# Patient Record
Sex: FEMALE | Race: OTHER | Employment: FULL TIME | URBAN - NONMETROPOLITAN AREA
[De-identification: names, ages, dates, MRNs, and addresses within clinical notes are randomized per-mention and may not be internally consistent; named-entity substitution may affect disease eponyms.]

---

## 2017-11-24 ENCOUNTER — HOSPITAL ENCOUNTER (EMERGENCY)
Age: 37
Discharge: HOME | End: 2017-11-24
Attending: EMERGENCY MEDICINE
Payer: COMMERCIAL

## 2017-11-24 VITALS
TEMPERATURE: 98.4 F | RESPIRATION RATE: 16 BRPM | SYSTOLIC BLOOD PRESSURE: 108 MMHG | HEART RATE: 81 BPM | OXYGEN SATURATION: 98 % | DIASTOLIC BLOOD PRESSURE: 70 MMHG

## 2017-11-24 DIAGNOSIS — R10.13 ABDOMINAL PAIN, EPIGASTRIC: Primary | ICD-10-CM

## 2017-11-24 LAB
ALBUMIN SERPL-MCNC: 3.2 GM/DL (ref 3.5–5)
ALP BLD-CCNC: 81 U/L (ref 46–116)
ALT SERPL-CCNC: 24 U/L (ref 12–78)
AMORPHOUS: NORMAL
ANION GAP: 7 MEQ/L (ref 8–16)
AST SERPL-CCNC: 16 U/L (ref 15–37)
BACTERIA: NORMAL
BASOPHILS # BLD: 0.6 % (ref 0–3)
BILIRUB SERPL-MCNC: 0.2 MG/DL (ref 0.2–1)
BILIRUBIN URINE: NEGATIVE
BLOOD, URINE: NEGATIVE
BUN BLDV-MCNC: 7 MG/DL (ref 7–18)
CASTS UA: NORMAL /LPF
CHARACTER, URINE: CLEAR
CHLORIDE BLD-SCNC: 103 MEQ/L (ref 98–107)
CO2: 29 MEQ/L (ref 21–32)
COLOR: YELLOW
CREAT SERPL-MCNC: 0.6 MG/DL (ref 0.6–1.1)
CRYSTALS, UA: NORMAL
EOSINOPHILS RELATIVE PERCENT: 5.2 % (ref 0–4)
EPITHELIAL CELLS, UA: NORMAL /HPF
GFR, ESTIMATED: > 90 ML/MIN/1.73M2
GLUCOSE BLD-MCNC: 89 MG/DL (ref 74–106)
GLUCOSE, URINE: NEGATIVE MG/DL
HCT VFR BLD CALC: 39.1 % (ref 37–47)
HEMOGLOBIN: 13.2 GM/DL (ref 12–16)
KETONES, URINE: NEGATIVE
LEUKOCYTE ESTERASE, URINE: NEGATIVE
LIPASE: 175 U/L (ref 65–230)
LYMPHOCYTES # BLD: 36 % (ref 15–47)
MCH RBC QN AUTO: 30.2 PG (ref 27–31)
MCHC RBC AUTO-ENTMCNC: 33.8 GM/DL (ref 33–37)
MCV RBC AUTO: 89.2 FL (ref 81–99)
MONOCYTES: 7.1 % (ref 0–12)
MUCUS: NORMAL
NITRITE, URINE: NEGATIVE
PDW BLD-RTO: 11.8 % (ref 11.5–14.5)
PH UA: 6.5 (ref 5–9)
PLATELET # BLD: 250 THOU/MM3 (ref 130–400)
PMV BLD AUTO: 7.8 MCM (ref 7.4–10.4)
POC CALCIUM: 8.4 MG/DL (ref 8.5–10.1)
POTASSIUM SERPL-SCNC: 3.6 MEQ/L (ref 3.5–5.1)
PREGNANCY, URINE: NEGATIVE
PROTEIN UA: NEGATIVE MG/DL
RBC # BLD: 4.38 MILL/MM3 (ref 4.2–5.4)
RBC UA: NORMAL /HPF
REFLEX TO URINE C & S: NORMAL
SEGS: 51.1 % (ref 43–75)
SODIUM BLD-SCNC: 139 MEQ/L (ref 136–145)
SPECIFIC GRAVITY UA: 1.01 (ref 1–1.03)
TOTAL PROTEIN: 7.4 GM/DL (ref 6.4–8.2)
UROBILINOGEN, URINE: 0.2 EU/DL (ref 0–1)
WBC # BLD: 6.9 THOU/MM3 (ref 4.8–10.8)
WBC UA: NORMAL /HPF

## 2017-11-24 PROCEDURE — 99284 EMERGENCY DEPT VISIT MOD MDM: CPT

## 2017-11-24 PROCEDURE — 83690 ASSAY OF LIPASE: CPT

## 2017-11-24 PROCEDURE — 36415 COLL VENOUS BLD VENIPUNCTURE: CPT

## 2017-11-24 PROCEDURE — 81001 URINALYSIS AUTO W/SCOPE: CPT

## 2017-11-24 PROCEDURE — 85025 COMPLETE CBC W/AUTO DIFF WBC: CPT

## 2017-11-24 PROCEDURE — 84703 CHORIONIC GONADOTROPIN ASSAY: CPT

## 2017-11-24 PROCEDURE — 6370000000 HC RX 637 (ALT 250 FOR IP): Performed by: EMERGENCY MEDICINE

## 2017-11-24 PROCEDURE — 80053 COMPREHEN METABOLIC PANEL: CPT

## 2017-11-24 RX ORDER — OMEPRAZOLE 40 MG/1
40 CAPSULE, DELAYED RELEASE ORAL DAILY
Qty: 30 CAPSULE | Refills: 0 | Status: SHIPPED | OUTPATIENT
Start: 2017-11-24 | End: 2021-10-02

## 2017-11-24 RX ADMIN — Medication 30 ML: at 15:29

## 2017-11-24 ASSESSMENT — PAIN SCALES - GENERAL: PAINLEVEL_OUTOF10: 3

## 2017-11-24 ASSESSMENT — PAIN DESCRIPTION - LOCATION: LOCATION: ABDOMEN

## 2017-11-24 ASSESSMENT — PAIN DESCRIPTION - ORIENTATION: ORIENTATION: MID;UPPER

## 2017-11-24 ASSESSMENT — PAIN DESCRIPTION - PAIN TYPE: TYPE: ACUTE PAIN

## 2017-11-24 ASSESSMENT — PAIN SCALES - WONG BAKER: WONGBAKER_NUMERICALRESPONSE: 0

## 2017-11-24 NOTE — ED PROVIDER NOTES
eMERGENCY dEPARTMENT eNCOUnter      [address] [facility]    Chief Complaint   Patient presents with    Abdominal Pain       HPI    Landy Calderon is a 40 y.o. female who presents  Above-noted complaint. Patient presents with abdominal pain. Pain was more severe couple days gets infected scalp a lot better but still present. She called her doctor's office and they told her to come here. She complains of mid epigastric pain and slight right upper quadrant pain. Initially may be hard to breathe but not currently. Denies vomiting or fever at this time. She thought it could be gas. PAST MEDICAL HISTORY    History reviewed. No pertinent past medical history. SURGICAL HISTORY    Past Surgical History:   Procedure Laterality Date    ANKLE FRACTURE SURGERY      TENDON RELEASE      TUBAL LIGATION  14 years ago       CURRENT MEDICATIONS        ALLERGIES    No Known Allergies    FAMILY HISTORY    History reviewed. No pertinent family history. SOCIAL HISTORY    Social History     Social History    Marital status:      Spouse name: N/A    Number of children: N/A    Years of education: N/A     Social History Main Topics    Smoking status: Never Smoker    Smokeless tobacco: Never Used    Alcohol use Yes      Comment: occasional    Drug use: Unknown    Sexual activity: Not Asked     Other Topics Concern    None     Social History Narrative    None       REVIEW OF SYSTEMS    Positive for right quadrant pain. No urinary symptoms. No dysuria hematuria. No melena. All systems negative except as marked. PHYSICAL EXAM    VITAL SIGNS: /70   Pulse 81   Temp 98.4 °F (36.9 °C) (Oral)   Resp 16   LMP 11/17/2017   SpO2 98%    Constitutional:  Alert not toxtic or ill, pleasant and able to give coherent history  HENT:  Normocephalic, Atraumatic, Bilateral external ears normal, Oropharynx moist, No oral exudates, Nose normal.  Cervical Spine: Normal range of motion,  No stridor.  No tenderness, Supple,  Eyes:  No discharge or  Swelling,Conjunctiva normal, PERRL, EOMI,  Respiratory: No respiratory distress, Normal breath sounds,  No wheezing, No chest tenderness. Cardiovascular:  Normal heart rate, Normal rhythm, No murmurs, No rubs, No gallops. GI:  Minimal right upper quadrant reproducible pain, Bowel sounds normal, Soft, No masses, No pulsatile masses. No peritoneal tenderness, negative Humphreys's  Musculoskeletal:  Intact distal pulses, No edema, No tenderness, No cyanosis, No clubbing. Good range of motion in all major joints. No tenderness to palpation or major deformities noted. Back:No tenderness. Integument:  Warm, Dry, No erythema, No rash (on exposed areas)   Lymphatic:  No lymphadenopathy noted. Neurologic:  Alert & oriented x 3, Normal motor function, Normal sensory function, No focal deficits noted. Psychiatric:  Affect normal, Judgment normal, Mood normal.                 RADIOLOGY    US GALLBLADDER RUQ    (Results Pending)       PROCEDURES    none      CONSULTS:  None      CRITICAL CARE:  None      ED COURSE & MEDICAL DECISION MAKING    Pertinent Labs & Imaging studies reviewed. (See chart for details)  Midepigastric and right upper quadrant abdominal pain. No associated symptoms such as high fever chills. Symptoms are actually a lot better than they were. Her clinical exam here is benign although has some residual right upper quadrant tenderness. Informed her would like to get gallbladder ultrasound set up as an outpatient. She'll need close follow-up with primary care. We are checking some routine labs and urine. 4:00 PM  Lab tests are normal including CBC and Chem-7 lipase. Nonspecific abdominal pain. Could represent some biliary colic versus some reflux or GI symptoms. Nothing else to suggest peritonitis. Informed patient we need close follow-up with primary care. Place her on a PPI for the next few days and follow-up    FINAL IMPRESSION    1.  Abdominal pain,

## 2018-02-16 ENCOUNTER — HOSPITAL ENCOUNTER (OUTPATIENT)
Dept: MRI IMAGING | Age: 38
Discharge: HOME | End: 2018-02-16
Payer: COMMERCIAL

## 2018-02-16 DIAGNOSIS — M79.671 RIGHT FOOT PAIN: ICD-10-CM

## 2018-02-16 PROCEDURE — 73718 MRI LOWER EXTREMITY W/O DYE: CPT

## 2020-03-05 ENCOUNTER — HOSPITAL ENCOUNTER (OUTPATIENT)
Dept: MRI IMAGING | Age: 40
Discharge: HOME | End: 2020-03-05
Payer: COMMERCIAL

## 2020-03-05 PROCEDURE — 72148 MRI LUMBAR SPINE W/O DYE: CPT

## 2021-10-02 ENCOUNTER — HOSPITAL ENCOUNTER (EMERGENCY)
Age: 41
Discharge: HOME | End: 2021-10-03
Attending: FAMILY MEDICINE
Payer: COMMERCIAL

## 2021-10-02 ENCOUNTER — APPOINTMENT (OUTPATIENT)
Dept: CT IMAGING | Age: 41
End: 2021-10-02
Payer: COMMERCIAL

## 2021-10-02 VITALS
HEART RATE: 80 BPM | HEIGHT: 59 IN | SYSTOLIC BLOOD PRESSURE: 113 MMHG | TEMPERATURE: 98 F | DIASTOLIC BLOOD PRESSURE: 78 MMHG | OXYGEN SATURATION: 100 % | RESPIRATION RATE: 16 BRPM | BODY MASS INDEX: 39.92 KG/M2 | WEIGHT: 198 LBS

## 2021-10-02 DIAGNOSIS — R55 SYNCOPE AND COLLAPSE: Primary | ICD-10-CM

## 2021-10-02 LAB
ALBUMIN SERPL-MCNC: 3.2 GM/DL (ref 3.4–5)
ALP BLD-CCNC: 74 U/L (ref 46–116)
ALT SERPL-CCNC: 26 U/L (ref 14–63)
ANALYZED BY:: NORMAL
ANION GAP: 8 MEQ/L (ref 8–16)
AST SERPL-CCNC: 14 U/L (ref 15–37)
BASOPHILS # BLD: 0.5 % (ref 0–3)
BILIRUB SERPL-MCNC: 0.2 MG/DL (ref 0.2–1)
BUN BLDV-MCNC: 8 MG/DL (ref 7–18)
CHLORIDE BLD-SCNC: 102 MEQ/L (ref 98–107)
CO2: 29 MEQ/L (ref 21–32)
CREAT SERPL-MCNC: 1.1 MG/DL (ref 0.6–1.3)
DATE OF COLLECTION: NORMAL
DRAWN BY: NORMAL
EOSINOPHILS RELATIVE PERCENT: 3.9 % (ref 0–4)
ETHYL ALCOHOL: < 0.01 % (GM/DL)
GFR, ESTIMATED: 58 ML/MIN/1.73M2
GLUCOSE BLD-MCNC: 102 MG/DL (ref 70–108)
GLUCOSE BLD-MCNC: 99 MG/DL (ref 74–106)
HCT VFR BLD CALC: 40.2 % (ref 37–47)
HEMOGLOBIN: 12.9 GM/DL (ref 12–16)
LYMPHOCYTES # BLD: 25.4 % (ref 15–47)
Lab: 2240
Lab: NORMAL
MCH RBC QN AUTO: 29.1 PG (ref 27–31)
MCHC RBC AUTO-ENTMCNC: 32.2 GM/DL (ref 33–37)
MCV RBC AUTO: 90.5 FL (ref 81–99)
MONOCYTES: 7.2 % (ref 0–12)
PDW BLD-RTO: 13.7 % (ref 11.5–14.5)
PLATELET # BLD: 310 THOU/MM3 (ref 130–400)
PMV BLD AUTO: 7.9 FL (ref 7.4–10.4)
POC CALCIUM: 8.4 MG/DL (ref 8.5–10.1)
POTASSIUM SERPL-SCNC: 3.4 MEQ/L (ref 3.5–5.1)
PREGNANCY, SERUM: NEGATIVE
RBC # BLD: 4.44 MILL/MM3 (ref 4.2–5.4)
SEGS: 63 % (ref 43–75)
SODIUM BLD-SCNC: 139 MEQ/L (ref 136–145)
TOTAL PROTEIN: 7.3 GM/DL (ref 6.4–8.2)
TROPONIN I: < 0.017 NG/ML (ref 0.02–0.06)
WBC # BLD: 8 THOU/MM3 (ref 4.8–10.8)

## 2021-10-02 PROCEDURE — 82948 REAGENT STRIP/BLOOD GLUCOSE: CPT

## 2021-10-02 PROCEDURE — 99284 EMERGENCY DEPT VISIT MOD MDM: CPT

## 2021-10-02 PROCEDURE — 93005 ELECTROCARDIOGRAM TRACING: CPT | Performed by: FAMILY MEDICINE

## 2021-10-02 PROCEDURE — 2580000003 HC RX 258: Performed by: FAMILY MEDICINE

## 2021-10-02 PROCEDURE — 80053 COMPREHEN METABOLIC PANEL: CPT

## 2021-10-02 PROCEDURE — 70450 CT HEAD/BRAIN W/O DYE: CPT

## 2021-10-02 PROCEDURE — 36415 COLL VENOUS BLD VENIPUNCTURE: CPT

## 2021-10-02 PROCEDURE — 85025 COMPLETE CBC W/AUTO DIFF WBC: CPT

## 2021-10-02 PROCEDURE — 84484 ASSAY OF TROPONIN QUANT: CPT

## 2021-10-02 PROCEDURE — 84703 CHORIONIC GONADOTROPIN ASSAY: CPT

## 2021-10-02 PROCEDURE — 82077 ASSAY SPEC XCP UR&BREATH IA: CPT

## 2021-10-02 RX ORDER — 0.9 % SODIUM CHLORIDE 0.9 %
500 INTRAVENOUS SOLUTION INTRAVENOUS ONCE
Status: COMPLETED | OUTPATIENT
Start: 2021-10-02 | End: 2021-10-02

## 2021-10-02 RX ADMIN — SODIUM CHLORIDE 500 ML: 9 INJECTION, SOLUTION INTRAVENOUS at 22:43

## 2021-10-02 ASSESSMENT — ENCOUNTER SYMPTOMS
SHORTNESS OF BREATH: 0
ABDOMINAL PAIN: 0
VOMITING: 0
COUGH: 0
NAUSEA: 0

## 2021-10-03 NOTE — ED NOTES
Patient up to W/C to bathroom. Observed patient steady no complaint of dizziness. Take to and back from bathroom to bed.       Kristie Leonard RN  10/02/21 [id number]

## 2021-10-03 NOTE — ED NOTES
Observed patient sitting up in bed, resp easy, warm and dry. Patient stable for discharge,  instructions provided. Patient educated on rest, increased fluids, position changes, signs and symptoms to monitor, and follow up. Patient verbalized understanding, questions denied. Assisted in getting patient dressed. Patient declined W/C. Observed patient steadily ambulate from the department after discharge with significant.       Jaclyn Marcial RN  10/03/21 0002

## 2021-10-03 NOTE — ED PROVIDER NOTES
[facility]  eMERGENCY dEPARTMENT eNCOUnter          CHIEF COMPLAINT       Chief Complaint   Patient presents with    Loss of Consciousness       Nurses Notes reviewed and I agree except as noted in the HPI. HISTORY OF PRESENT ILLNESS    Asher Kay is a 39 y.o. female who presents after passing out. Patient states that while at a local establishment she was seated in the chair holding her baby and appeared to lean over and according to bystanders the patient was unresponsive for a brief period of time.  who is accompanying the patient states that he thought she started to have a brief shaking episode. The patient admits to urinating during the episode. The patient notes she felt anxious just prior to passing out. The patient denies history of syncope. She denies chest pain. Denies recent illness. She admits to drinking to beers prior to syncopal episode. REVIEW OF SYSTEMS     Review of Systems   Constitutional: Negative for chills and fever. Respiratory: Negative for cough and shortness of breath. Cardiovascular: Negative for chest pain and palpitations. Gastrointestinal: Negative for abdominal pain, nausea and vomiting. Genitourinary: Negative for difficulty urinating and dysuria. Musculoskeletal: Negative for arthralgias, myalgias, neck pain and neck stiffness. Neurological: Positive for dizziness and syncope. Psychiatric/Behavioral: Negative for agitation and behavioral problems. All other systems reviewed and are negative. PAST MEDICAL HISTORY    has a past medical history of Anxiety. SURGICAL HISTORY      has a past surgical history that includes Tubal ligation (14 years ago); Ankle fracture surgery; and tendon release. CURRENT MEDICATIONS       Previous Medications    No medications on file       ALLERGIES     has No Known Allergies. FAMILY HISTORY     has no family status information on file.     family history is not on file.    SOCIAL HISTORY      reports that she has never smoked. She has never used smokeless tobacco. She reports current alcohol use. PHYSICAL EXAM     INITIAL VITALS:  height is 4' 11\" (1.499 m) and weight is 198 lb (89.8 kg). Her oral temperature is 98 °F (36.7 °C). Her blood pressure is 91/52 (abnormal) and her pulse is 80. Her respiration is 20 and oxygen saturation is 98%. Physical Exam  Vitals and nursing note reviewed. Constitutional:       General: She is not in acute distress. HENT:      Head: Normocephalic and atraumatic. Nose: Nose normal.   Eyes:      Extraocular Movements: Extraocular movements intact. Conjunctiva/sclera: Conjunctivae normal.      Pupils: Pupils are equal, round, and reactive to light. Neck:      Vascular: No carotid bruit. Cardiovascular:      Rate and Rhythm: Normal rate and regular rhythm. Pulses: Normal pulses. Heart sounds: Normal heart sounds. Pulmonary:      Effort: Pulmonary effort is normal.      Breath sounds: Normal breath sounds. Musculoskeletal:         General: No tenderness or signs of injury. Normal range of motion. Cervical back: Normal range of motion and neck supple. Skin:     General: Skin is dry. Capillary Refill: Capillary refill takes less than 2 seconds. Findings: No bruising or rash. Neurological:      General: No focal deficit present. Mental Status: She is alert and oriented to person, place, and time. Cranial Nerves: No cranial nerve deficit. Sensory: No sensory deficit. Psychiatric:         Mood and Affect: Mood normal.         Behavior: Behavior normal.         DIFFERENTIAL DIAGNOSIS:   Syncope,seizure,metabolic derangement,    DIAGNOSTIC RESULTS     EKG: All EKG's are interpreted by the Emergency Department Physician who either signs or Co-signs this chart in the absence of a cardiologist.  NSR,no acute st elevation,no axis deviation.      RADIOLOGY: non-plain film images(s) such as CT, Ultrasound and MRI are read by the radiologist.      CT HEAD WO CONTRAST (Final result)  Result time 10/02/21 23:25:40  Final result by Anne Gee MD (10/02/21 23:25:40)                Impression:    1. No acute disease in the brain. 2. Paranasal sinus disease is noted. This document has been electronically signed by: Anne Gee M.D. on   10/02/2021 11:25 PM     All CTs at this facility use dose modulation techniques and iterative   reconstructions, and/or weight-based dosing   when appropriate to reduce radiation to a low as reasonably achievable. Narrative:    CT SCAN OF THE BRAIN WITHOUT CONTRAST     COMPARISON: None. TECHNIQUE: A noncontrast CT scan of the brain was performed. A dose   reduction technique was utilized. FINDINGS: There is no evidence of an acute infarct or intraparenchymal   hemorrhage. There is no mass effect, midline shift, or extra-axial fluid   collection. The ventricles are normal in size without hydrocephalus. The   calvarium is unremarkable. The visualized portions of the mastoid air   cells and middle ear cavities are unremarkable. A small air-fluid level is noted within the left sphenoid sinus. A   retention cyst or polyp is noted within the inferior aspect of the left   maxillary sinus.                    LABS:   Labs Reviewed   CBC WITH AUTO DIFFERENTIAL - Abnormal; Notable for the following components:       Result Value    MCHC 32.2 (*)     All other components within normal limits   COMPREHENSIVE METABOLIC PANEL - Abnormal; Notable for the following components:    Potassium 3.4 (*)     POC CALCIUM 8.4 (*)     AST 14 (*)     Albumin 3.2 (*)     All other components within normal limits   GLOMERULAR FILTRATION RATE, ESTIMATED - Abnormal; Notable for the following components:    GFR, Estimated 58 (*)     All other components within normal limits   ETHANOL   TROPONIN   HCG, SERUM, QUALITATIVE   ANION GAP   POCT GLUCOSE       EMERGENCY DEPARTMENT Efforts were made to edit the dictations but occasionally words are mis-transcribed.)    MD Salazar Brown MD  10/02/21 1489

## 2021-10-03 NOTE — ED TRIAGE NOTES
Patient reports, \"I was holding my grand baby, and I felt funny. They say I passed out holding her. I don't know [place] were at [place] [employer] and the music and lights strobing. I was breathing fast and then I woke up and people were saying I passed out. Never happened before. I feel funny yet. \" Observed patient pink and warm, resp easy. Placed on monitor.

## 2021-10-04 LAB
EKG ATRIAL RATE: 69 BPM
EKG P AXIS: 37 DEGREES
EKG P-R INTERVAL: 130 MS
EKG Q-T INTERVAL: 390 MS
EKG QRS DURATION: 86 MS
EKG QTC CALCULATION (BAZETT): 417 MS
EKG R AXIS: 39 DEGREES
EKG T AXIS: 40 DEGREES
EKG VENTRICULAR RATE: 69 BPM

## 2021-10-04 PROCEDURE — 93010 ELECTROCARDIOGRAM REPORT: CPT | Performed by: INTERNAL MEDICINE

## 2022-09-26 ENCOUNTER — HOSPITAL ENCOUNTER (OUTPATIENT)
Dept: MRI IMAGING | Age: 42
Discharge: HOME | End: 2022-09-26
Payer: COMMERCIAL

## 2022-09-26 DIAGNOSIS — M47.816 LUMBAR SPONDYLOSIS: ICD-10-CM

## 2022-09-26 DIAGNOSIS — M54.16 LUMBAR NEURITIS: ICD-10-CM

## 2022-09-26 PROCEDURE — 72148 MRI LUMBAR SPINE W/O DYE: CPT

## 2024-11-21 ENCOUNTER — TELEPHONE (OUTPATIENT)
Dept: SURGERY | Age: 44
End: 2024-11-21

## 2024-11-21 PROBLEM — M76.61 RIGHT ACHILLES TENDINITIS: Status: ACTIVE | Noted: 2024-11-21

## 2024-11-21 NOTE — TELEPHONE ENCOUNTER
----- Message from Dr. Monty Weir MD sent at 11/21/2024  6:29 AM EST -----    I dont take care of rectocele, and the pcp note says refer to GI so I think other people needs to be involved, GI and GYN for rectocele

## 2024-11-21 NOTE — TELEPHONE ENCOUNTER
Patient notified of Dr. Weir's note below.  Appointment canceled.  Omayra Ortiz, CNP office notified patient needs referred to GI and GYN